# Patient Record
Sex: FEMALE | Race: ASIAN | Employment: UNEMPLOYED | URBAN - METROPOLITAN AREA
[De-identification: names, ages, dates, MRNs, and addresses within clinical notes are randomized per-mention and may not be internally consistent; named-entity substitution may affect disease eponyms.]

---

## 2023-06-21 ENCOUNTER — TELEPHONE (OUTPATIENT)
Facility: CLINIC | Age: 23
End: 2023-06-21

## 2023-06-21 ENCOUNTER — INITIAL PRENATAL (OUTPATIENT)
Facility: CLINIC | Age: 23
End: 2023-06-21
Payer: MEDICAID

## 2023-06-21 ENCOUNTER — HOSPITAL ENCOUNTER (OUTPATIENT)
Dept: ULTRASOUND IMAGING | Facility: HOSPITAL | Age: 23
Discharge: HOME | End: 2023-06-21
Payer: MEDICAID

## 2023-06-21 VITALS
SYSTOLIC BLOOD PRESSURE: 96 MMHG | HEIGHT: 67.32 IN | WEIGHT: 115.19 LBS | BODY MASS INDEX: 17.87 KG/M2 | HEART RATE: 77 BPM | DIASTOLIC BLOOD PRESSURE: 60 MMHG

## 2023-06-21 DIAGNOSIS — O20.9 ANTEPARTUM BLEEDING, FIRST TRIMESTER: ICD-10-CM

## 2023-06-21 DIAGNOSIS — Z34.00 SUPERVISION OF NORMAL FIRST PREGNANCY, ANTEPARTUM: Primary | ICD-10-CM

## 2023-06-21 PROBLEM — R63.6 UNDERWEIGHT: Status: ACTIVE | Noted: 2023-06-21

## 2023-06-21 PROBLEM — O03.4 INCOMPLETE SPONTANEOUS ABORTION: Status: ACTIVE | Noted: 2023-06-21

## 2023-06-21 PROBLEM — Z78.9 LANGUAGE BARRIER: Status: ACTIVE | Noted: 2023-06-21

## 2023-06-21 PROBLEM — Z60.3 LANGUAGE BARRIER: Status: ACTIVE | Noted: 2023-06-21

## 2023-06-21 PROBLEM — Z75.8 LANGUAGE BARRIER: Status: ACTIVE | Noted: 2023-06-21

## 2023-06-21 PROBLEM — O03.4 INCOMPLETE SPONTANEOUS ABORTION (HCC): Status: ACTIVE | Noted: 2023-06-21

## 2023-06-21 LAB
APPEARANCE: CLEAR
BILIRUB UR QL STRIP.AUTO: NEGATIVE
BILIRUBIN: NEGATIVE
CLARITY UR REFRACT.AUTO: CLEAR
COLOR UR AUTO: YELLOW
GLUCOSE (URINE DIPSTICK): NEGATIVE MG/DL
GLUCOSE UR STRIP.AUTO-MCNC: NEGATIVE MG/DL
KETONES (URINE DIPSTICK): NEGATIVE MG/DL
KETONES UR STRIP.AUTO-MCNC: NEGATIVE MG/DL
MULTISTIX LOT#: ABNORMAL NUMERIC
NITRITE UR QL STRIP.AUTO: NEGATIVE
NITRITE, URINE: NEGATIVE
PH UR STRIP.AUTO: 6 [PH] (ref 5–8)
PH, URINE: 5.5 (ref 4.5–8)
PROT UR STRIP.AUTO-MCNC: NEGATIVE MG/DL
PROTEIN (URINE DIPSTICK): NEGATIVE MG/DL
SP GR UR STRIP.AUTO: 1.01 (ref 1–1.03)
SPECIFIC GRAVITY: 1.02 (ref 1–1.03)
URINE-COLOR: YELLOW
UROBILINOGEN UR STRIP.AUTO-MCNC: <2 MG/DL
UROBILINOGEN,SEMI-QN: 0.2 MG/DL (ref 0–1.9)

## 2023-06-21 PROCEDURE — 3078F DIAST BP <80 MM HG: CPT

## 2023-06-21 PROCEDURE — 87086 URINE CULTURE/COLONY COUNT: CPT

## 2023-06-21 PROCEDURE — 81002 URINALYSIS NONAUTO W/O SCOPE: CPT

## 2023-06-21 PROCEDURE — 76817 TRANSVAGINAL US OBSTETRIC: CPT

## 2023-06-21 PROCEDURE — 0500F INITIAL PRENATAL CARE VISIT: CPT

## 2023-06-21 PROCEDURE — 3008F BODY MASS INDEX DOCD: CPT

## 2023-06-21 PROCEDURE — 3074F SYST BP LT 130 MM HG: CPT

## 2023-06-21 PROCEDURE — 76801 OB US < 14 WKS SINGLE FETUS: CPT

## 2023-06-21 PROCEDURE — 81001 URINALYSIS AUTO W/SCOPE: CPT

## 2023-06-21 NOTE — TELEPHONE ENCOUNTER
Called Neli on the STAT scheduling line for central scheduling. In formed her a STAT OB US was needed. Patient 11w2d bleeding. Scheduled for today at [facility] at 4:30 pm. Using [place]  instructions given. Walked to outpatient after patient drank 32 oz of water in the office.

## 2023-06-21 NOTE — TELEPHONE ENCOUNTER
[place]  [id number] utilized for this phone call. Discussed with patient her US results, no fhr seen, fetus measuring 7wk6d when her Gestational age by LMP is 11w2d. Instructed pt a nurse or doctor will call her back with plan of care. Instructed her to go to ED if saturating a pad a hour or have pelvic pain. Verbalizes understanding.

## 2023-06-22 ENCOUNTER — TELEPHONE (OUTPATIENT)
Facility: CLINIC | Age: 23
End: 2023-06-22

## 2023-06-22 ENCOUNTER — LABORATORY ENCOUNTER (OUTPATIENT)
Dept: LAB | Facility: HOSPITAL | Age: 23
End: 2023-06-22
Attending: OBSTETRICS & GYNECOLOGY
Payer: MEDICAID

## 2023-06-22 DIAGNOSIS — Z01.818 PRE-OP TESTING: ICD-10-CM

## 2023-06-22 DIAGNOSIS — O02.1 MISSED ABORTION: Primary | ICD-10-CM

## 2023-06-22 LAB
ANTIBODY SCREEN: NEGATIVE
ERYTHROCYTE [DISTWIDTH] IN BLOOD BY AUTOMATED COUNT: 12.1 %
HCT VFR BLD AUTO: 37.8 %
HGB BLD-MCNC: 12.7 G/DL
MCH RBC QN AUTO: 27.9 PG (ref 26–34)
MCHC RBC AUTO-ENTMCNC: 33.6 G/DL (ref 31–37)
MCV RBC AUTO: 83.1 FL
PLATELET # BLD AUTO: 239 10(3)UL (ref 150–450)
RBC # BLD AUTO: 4.55 X10(6)UL
RH BLOOD TYPE: POSITIVE
WBC # BLD AUTO: 7.3 X10(3) UL (ref 4–11)

## 2023-06-22 PROCEDURE — 86850 RBC ANTIBODY SCREEN: CPT

## 2023-06-22 PROCEDURE — 86901 BLOOD TYPING SEROLOGIC RH(D): CPT

## 2023-06-22 PROCEDURE — 36415 COLL VENOUS BLD VENIPUNCTURE: CPT

## 2023-06-22 PROCEDURE — 85027 COMPLETE CBC AUTOMATED: CPT

## 2023-06-22 PROCEDURE — 86900 BLOOD TYPING SEROLOGIC ABO: CPT

## 2023-06-22 NOTE — TELEPHONE ENCOUNTER
Called patient using a [place]  #[phone]. She is aware she need to have her blood type drawn and depending on the results may need Rhogam and why. Questions addressed and understanding verbalized. Transferred to [place] to discuss scheduled D&C date and time.

## 2023-06-22 NOTE — PROGRESS NOTES
Initial OB 11w2d     [place]  utilized for this visit.      EDC by LMP ( irregular periods)    Pt reports vaginal bleeding for three days - stat US ordered placed today     ObHx:   Two term , 2nd delivery breech presentation - developed a hematoma postpartum   PMhx: tested positive  hepatitis, denies VTE, blood transfusions, or HSV   PSHx: denies       Pt reports vaginal bleeding for three days - speculum exam done, dark red blood oozing from cervix - stat US ordered at hospital today - pt may be miscarrying   -Blood type -order placed today

## 2023-06-22 NOTE — TELEPHONE ENCOUNTER
----- Message from EMILY Sahu sent at 6/21/2023  6:13 PM CDT -----  Regarding: suction D&C  Please schedule her for a suction D&C early Friday 6/23.

## 2023-06-22 NOTE — TELEPHONE ENCOUNTER
----- Message from EMILY Sanon sent at 6/21/2023  7:52 PM CDT -----  Regarding: lab draw  Please call pt to let her know she needs her blood type drawn. I placed the order.

## 2023-06-23 ENCOUNTER — HOSPITAL ENCOUNTER (OUTPATIENT)
Facility: HOSPITAL | Age: 23
Setting detail: HOSPITAL OUTPATIENT SURGERY
Discharge: HOME | End: 2023-06-23
Attending: OBSTETRICS & GYNECOLOGY | Admitting: OBSTETRICS & GYNECOLOGY
Payer: MEDICAID

## 2023-06-23 ENCOUNTER — ANESTHESIA EVENT (OUTPATIENT)
Dept: SURGERY | Facility: HOSPITAL | Age: 23
End: 2023-06-23
Payer: MEDICAID

## 2023-06-23 ENCOUNTER — ANESTHESIA (OUTPATIENT)
Dept: SURGERY | Facility: HOSPITAL | Age: 23
End: 2023-06-23
Payer: MEDICAID

## 2023-06-23 VITALS
BODY MASS INDEX: 18.21 KG/M2 | TEMPERATURE: 97 F | DIASTOLIC BLOOD PRESSURE: 61 MMHG | HEIGHT: 67 IN | SYSTOLIC BLOOD PRESSURE: 98 MMHG | WEIGHT: 116 LBS | HEART RATE: 79 BPM | RESPIRATION RATE: 16 BRPM | OXYGEN SATURATION: 100 %

## 2023-06-23 DIAGNOSIS — Z01.818 PRE-OP TESTING: Primary | ICD-10-CM

## 2023-06-23 DIAGNOSIS — O02.1 MISSED ABORTION: ICD-10-CM

## 2023-06-23 PROCEDURE — 59820 CARE OF MISCARRIAGE: CPT | Performed by: OBSTETRICS & GYNECOLOGY

## 2023-06-23 PROCEDURE — 10D17ZZ EXTRACTION OF PRODUCTS OF CONCEPTION, RETAINED, VIA NATURAL OR ARTIFICIAL OPENING: ICD-10-PCS | Performed by: OBSTETRICS & GYNECOLOGY

## 2023-06-23 RX ORDER — KETOROLAC TROMETHAMINE 30 MG/ML
INJECTION, SOLUTION INTRAMUSCULAR; INTRAVENOUS AS NEEDED
Status: DISCONTINUED | OUTPATIENT
Start: 2023-06-23 | End: 2023-06-23 | Stop reason: SURG

## 2023-06-23 RX ORDER — HYDROMORPHONE HYDROCHLORIDE 1 MG/ML
0.4 INJECTION, SOLUTION INTRAMUSCULAR; INTRAVENOUS; SUBCUTANEOUS EVERY 5 MIN PRN
Status: DISCONTINUED | OUTPATIENT
Start: 2023-06-23 | End: 2023-06-23

## 2023-06-23 RX ORDER — METHYLERGONOVINE MALEATE 0.2 MG/ML
INJECTION INTRAVENOUS AS NEEDED
Status: DISCONTINUED | OUTPATIENT
Start: 2023-06-23 | End: 2023-06-23 | Stop reason: SURG

## 2023-06-23 RX ORDER — ACETAMINOPHEN 500 MG
1000 TABLET ORAL ONCE
Status: DISCONTINUED | OUTPATIENT
Start: 2023-06-23 | End: 2023-06-23 | Stop reason: HOSPADM

## 2023-06-23 RX ORDER — PROCHLORPERAZINE EDISYLATE 5 MG/ML
5 INJECTION INTRAMUSCULAR; INTRAVENOUS EVERY 8 HOURS PRN
Status: DISCONTINUED | OUTPATIENT
Start: 2023-06-23 | End: 2023-06-23

## 2023-06-23 RX ORDER — SCOLOPAMINE TRANSDERMAL SYSTEM 1 MG/1
1 PATCH, EXTENDED RELEASE TRANSDERMAL ONCE
Status: DISCONTINUED | OUTPATIENT
Start: 2023-06-23 | End: 2023-06-23 | Stop reason: HOSPADM

## 2023-06-23 RX ORDER — SODIUM CHLORIDE, SODIUM LACTATE, POTASSIUM CHLORIDE, CALCIUM CHLORIDE 600; 310; 30; 20 MG/100ML; MG/100ML; MG/100ML; MG/100ML
INJECTION, SOLUTION INTRAVENOUS CONTINUOUS
Status: DISCONTINUED | OUTPATIENT
Start: 2023-06-23 | End: 2023-06-23

## 2023-06-23 RX ORDER — LIDOCAINE HYDROCHLORIDE 10 MG/ML
INJECTION, SOLUTION EPIDURAL; INFILTRATION; INTRACAUDAL; PERINEURAL AS NEEDED
Status: DISCONTINUED | OUTPATIENT
Start: 2023-06-23 | End: 2023-06-23 | Stop reason: SURG

## 2023-06-23 RX ORDER — TRAMADOL HYDROCHLORIDE 50 MG/1
50 TABLET ORAL ONCE AS NEEDED
Status: DISCONTINUED | OUTPATIENT
Start: 2023-06-23 | End: 2023-06-23

## 2023-06-23 RX ORDER — HYDROMORPHONE HYDROCHLORIDE 1 MG/ML
0.2 INJECTION, SOLUTION INTRAMUSCULAR; INTRAVENOUS; SUBCUTANEOUS EVERY 5 MIN PRN
Status: DISCONTINUED | OUTPATIENT
Start: 2023-06-23 | End: 2023-06-23

## 2023-06-23 RX ORDER — HEPARIN SODIUM 5000 [USP'U]/ML
5000 INJECTION, SOLUTION INTRAVENOUS; SUBCUTANEOUS ONCE
Status: DISCONTINUED | OUTPATIENT
Start: 2023-06-23 | End: 2023-06-23

## 2023-06-23 RX ORDER — ACETAMINOPHEN 500 MG
1000 TABLET ORAL ONCE AS NEEDED
Status: DISCONTINUED | OUTPATIENT
Start: 2023-06-23 | End: 2023-06-23

## 2023-06-23 RX ORDER — DIPHENHYDRAMINE HYDROCHLORIDE 50 MG/ML
12.5 INJECTION INTRAMUSCULAR; INTRAVENOUS AS NEEDED
Status: DISCONTINUED | OUTPATIENT
Start: 2023-06-23 | End: 2023-06-23

## 2023-06-23 RX ORDER — HYDROMORPHONE HYDROCHLORIDE 1 MG/ML
0.6 INJECTION, SOLUTION INTRAMUSCULAR; INTRAVENOUS; SUBCUTANEOUS EVERY 5 MIN PRN
Status: DISCONTINUED | OUTPATIENT
Start: 2023-06-23 | End: 2023-06-23

## 2023-06-23 RX ORDER — MIDAZOLAM HYDROCHLORIDE 1 MG/ML
1 INJECTION INTRAMUSCULAR; INTRAVENOUS EVERY 5 MIN PRN
Status: DISCONTINUED | OUTPATIENT
Start: 2023-06-23 | End: 2023-06-23

## 2023-06-23 RX ORDER — SCOLOPAMINE TRANSDERMAL SYSTEM 1 MG/1
1 PATCH, EXTENDED RELEASE TRANSDERMAL ONCE
Status: DISCONTINUED | OUTPATIENT
Start: 2023-06-23 | End: 2023-06-23

## 2023-06-23 RX ORDER — OXYCODONE HYDROCHLORIDE 5 MG/1
5 TABLET ORAL ONCE AS NEEDED
Status: DISCONTINUED | OUTPATIENT
Start: 2023-06-23 | End: 2023-06-23

## 2023-06-23 RX ORDER — MEPERIDINE HYDROCHLORIDE 25 MG/ML
12.5 INJECTION INTRAMUSCULAR; INTRAVENOUS; SUBCUTANEOUS AS NEEDED
Status: DISCONTINUED | OUTPATIENT
Start: 2023-06-23 | End: 2023-06-23

## 2023-06-23 RX ORDER — ONDANSETRON 2 MG/ML
4 INJECTION INTRAMUSCULAR; INTRAVENOUS EVERY 6 HOURS PRN
Status: DISCONTINUED | OUTPATIENT
Start: 2023-06-23 | End: 2023-06-23

## 2023-06-23 RX ORDER — NALOXONE HYDROCHLORIDE 0.4 MG/ML
80 INJECTION, SOLUTION INTRAMUSCULAR; INTRAVENOUS; SUBCUTANEOUS AS NEEDED
Status: DISCONTINUED | OUTPATIENT
Start: 2023-06-23 | End: 2023-06-23

## 2023-06-23 RX ORDER — SODIUM CHLORIDE, SODIUM LACTATE, POTASSIUM CHLORIDE, CALCIUM CHLORIDE 600; 310; 30; 20 MG/100ML; MG/100ML; MG/100ML; MG/100ML
INJECTION, SOLUTION INTRAVENOUS CONTINUOUS PRN
Status: DISCONTINUED | OUTPATIENT
Start: 2023-06-23 | End: 2023-06-23 | Stop reason: SURG

## 2023-06-23 RX ADMIN — LIDOCAINE HYDROCHLORIDE 50 MG: 10 INJECTION, SOLUTION EPIDURAL; INFILTRATION; INTRACAUDAL; PERINEURAL at 16:15:00

## 2023-06-23 RX ADMIN — METHYLERGONOVINE MALEATE 0.2 MG: 0.2 INJECTION INTRAVENOUS at 16:35:00

## 2023-06-23 RX ADMIN — SODIUM CHLORIDE, SODIUM LACTATE, POTASSIUM CHLORIDE, CALCIUM CHLORIDE: 600; 310; 30; 20 INJECTION, SOLUTION INTRAVENOUS at 16:12:00

## 2023-06-23 RX ADMIN — KETOROLAC TROMETHAMINE 30 MG: 30 INJECTION, SOLUTION INTRAMUSCULAR; INTRAVENOUS at 16:33:00

## 2023-06-23 NOTE — DISCHARGE INSTRUCTIONS
Home Care Instructions  DILATATION AND CURETTAGE (D&C)      1. Take it easy for the first 24 hours. Stay at home if possible and make sure someone is at home to help you or to report any symptoms or problems that you might have. 2. You may feel weak, dizzy, or a little lightheaded from the anesthesia during the first 24 hours. If so, stay in bed and rest and do not climb up and down the stairs. If you cannot completely avoid stairs, make sure that you have assistance. 3. Do not drive for 24 hours after surgery. 4. You may have spotting or discharge for the next few days. However, any heavy bleeding is abnormal and should be reported immediately. 5. Take you temperature once a day for the next 7 days and report to us if your temperature is up to 101 degrees or more in the absence of any common cold symptoms. 6. You may have some cramping, lower abdominal pains or shoulder pain for the first 24 hours. Usually two aspirin, Advil Ibuprofen or Tylenol every 4-6 hours takes care of this problem. If the pain persists or gets worse, notify the doctor immediately. 7. Avoid douching or intercourse for ten days. You may take a shower but do not take a bath for 10 days. 8. Please call in advance and make your appointment for a post-operative checkup at the office in 3-4 week.     9. Please do not hesitate to call if you have any problems or questions        [phone]    You received a drug called Toradol which is an Anti Inflammatory at: 4:30pm  If you are allowed to take Anti inflammatories:    Do not take any Anti Inflammatory like Motrin, Aleve or Ibuprofen until after: 10:30pm  Please report any suspected allergic reactions or bleeding issues to your doctor

## 2023-06-23 NOTE — OPERATIVE REPORT
Pre-op Dx: Missed AB, IUP 11w 4d  Post-op Dx:same  Surgeon: Nataliia Puentes  Procedure: suction  D&C  Complications: none  Findings: 11 week uterus  Procedure note: Pt was taken to the O.R. Where anesthesia was obtained without difficulty. She was then prepped and draped in normal sterile fashion in dorsal lithotomy position. Weighted speculum was placed in patients vagina and single tooth tenaculum applied to anterior lip of the cervix. Cervix was then gently dilated, uterus sounded to be 11 cm,  8 mm suction curette was introduced and uterus cleared of product of conceptin [facility] curettage was performed, all instruments removed from patients vagina, good hemostasis noted.

## 2023-07-12 LAB
CELLS ANALYZED CHRPOC: 13
CELLS COUNTED CHRPOC: 13
CELLS KARYOTYPED CHRPOC: 2
GTG BAND RES ACHIEVED CHRPOC: 400

## 2023-07-25 ENCOUNTER — LAB ENCOUNTER (OUTPATIENT)
Dept: LAB | Facility: HOSPITAL | Age: 23
End: 2023-07-25
Attending: OBSTETRICS & GYNECOLOGY
Payer: MEDICAID

## 2023-07-25 ENCOUNTER — OFFICE VISIT (OUTPATIENT)
Facility: CLINIC | Age: 23
End: 2023-07-25
Payer: MEDICAID

## 2023-07-25 VITALS
BODY MASS INDEX: 18 KG/M2 | WEIGHT: 118 LBS | HEART RATE: 83 BPM | DIASTOLIC BLOOD PRESSURE: 62 MMHG | SYSTOLIC BLOOD PRESSURE: 90 MMHG

## 2023-07-25 DIAGNOSIS — N92.6 IRREGULAR BLEEDING: Primary | ICD-10-CM

## 2023-07-25 DIAGNOSIS — N92.6 IRREGULAR BLEEDING: ICD-10-CM

## 2023-07-25 LAB — B-HCG SERPL-ACNC: 4 MIU/ML

## 2023-07-25 PROCEDURE — 36415 COLL VENOUS BLD VENIPUNCTURE: CPT

## 2023-07-25 PROCEDURE — 84702 CHORIONIC GONADOTROPIN TEST: CPT

## 2023-07-25 NOTE — PROGRESS NOTES
Ady Rodríguez is a 20year old female  Patient's last menstrual period was 2023. Patient presents with:  Post-Op: 2 wk, having some bleeding after the surgery  Sx 23 D&C  . Patient had D&C for SAB 23, started bleeding  and still continues, patient eventually would like to have an IUD for contraception    OBSTETRICS HISTORY:  OB History    Para Term  AB Living   4 2 2   1 2   SAB IAB Ectopic Multiple Live Births           2      # Outcome Date GA Lbr Justus/2nd Weight Sex Delivery Anes PTL Lv   4             3 Term 21 40w0d  6 lb 2.1 oz (2.78 kg) F NORMAL SPONT   LUIS ARMANDO   2 Term 06/10/19 40w0d  6 lb 9.8 oz (3 kg) M NORMAL SPONT   LUIS ARMANDO   1 AB               Obstetric Comments   Current - 1st OB visit 2023 - US on 23 at 12w2d by LMP. IUFD measuring 7w6d. GYNE HISTORY:  Periods regular monthly      Sexual activity:   Not on file                 MEDICAL HISTORY:  Past Medical History:   Diagnosis Date    Language barrier     [place]     PONV (postoperative nausea and vomiting)     Underweight 2023       SURGICAL HISTORY:  No past surgical history on file.     SOCIAL HISTORY:  Social History    Socioeconomic History      Marital status:       Spouse name: Not on file      Number of children: Not on file      Years of education: Not on file      Highest education level: Not on file    Occupational History      Not on file    Tobacco Use      Smoking status: Never      Smokeless tobacco: Never    Vaping Use      Vaping Use: Never used    Substance and Sexual Activity      Alcohol use: Not Currently      Drug use: Never      Sexual activity: Not on file    Other Topics      Concerns:        Not on file    Social History Narrative      Not on file    Social Determinants of Health  Financial Resource Strain: Not on file  Food Insecurity: Not on file  Transportation Needs: Not on file  Stress: Not on file  Housing Stability: Not on file    FAMILY HISTORY:  No family history on file. MEDICATIONS:  No current outpatient medications on file. ALLERGIES:  No Known Allergies      PHYSICAL EXAM:   Pelvic Exam:  External Genitalia: normal appearance, hair distribution, and no lesions  Urethral Meatus:  normal in size, location, without lesions and prolapse  Bladder:  No fullness, masses or tenderness  Vagina:  Normal appearance without lesions, no abnormal discharge  Cervix:  Normal without tenderness on motion  Uterus: normal in size, contour, position, mobility, without tenderness  Adnexa: normal without masses or tenderness  Perineum: normal  Anus: no hemorroids     Assessment & Plan:  1. Irregular bleeding  - check for retained POC - if tests negative return for IUD  - HCG, BETA SUBUNIT (QUANT PREGNANCY TEST); Future  - US TRANSVAG/ABDOMINAL EMG ONLY;  Future

## 2023-07-26 NOTE — PROGRESS NOTES
[place]  #[id number]    Pt aware of results. Will await ultrasound results. Verbalized understanding.

## 2023-07-27 ENCOUNTER — ULTRASOUND ENCOUNTER (OUTPATIENT)
Facility: CLINIC | Age: 23
End: 2023-07-27
Payer: MEDICAID

## 2023-07-27 ENCOUNTER — TELEPHONE (OUTPATIENT)
Facility: CLINIC | Age: 23
End: 2023-07-27

## 2023-07-27 RX ORDER — METHYLERGONOVINE MALEATE 0.2 MG/1
0.2 TABLET ORAL EVERY 8 HOURS
Qty: 9 TABLET | Refills: 0 | Status: SHIPPED | OUTPATIENT
Start: 2023-07-27 | End: 2023-07-30

## 2023-07-27 RX ORDER — DOXYCYCLINE HYCLATE 100 MG
100 TABLET ORAL 2 TIMES DAILY
Qty: 28 TABLET | Refills: 0 | Status: SHIPPED | OUTPATIENT
Start: 2023-07-27 | End: 2023-08-10

## 2023-07-27 NOTE — TELEPHONE ENCOUNTER
Pt requesting for medication to help with her bleeding. Discussed Per Dr. Sofya Ramirez no RPOC noted in [address], bleeding may decrease over time. Pt can take ibuprofen/Tylenol for pain management. Per pt's spouse they do not have work or money at this time. Language barrier is difficult for them. If any sign and symptoms below to go to ER:  Bleeding that soaks through 1 pad an hour and is not slowing down  Blood clots bigger than a golf ball  Feeling faint, dizzy, chills, sweaty, nauseous with bleeding. Reluctant to go to ER they have little kids at home. They plan to monitor bleeding for now. Will route for further recommendations. Patient verbalized understanding, agreed to and intend to comply with plan of care.

## 2023-07-27 NOTE — TELEPHONE ENCOUNTER
Pt complained of heavy bleeding since 07/16/2023 D&C. Pt had ultrasound today and was concerned she's bleeding too much. Recommended to wait for the call after MD reviews ultrasound results, if bleeding is really heavy to go to ER. Please call with [place] .

## 2023-07-27 NOTE — TELEPHONE ENCOUNTER
called pt twice did not go through    Last seen 7/25/23     Called pt's spouse was able to speak with pt. C/o heavy bleeding since 07/16/2023 D&C. Had ultrasound today     Soaked pad changes q 1-2h, some dizziness, abd pain, passing quarter size blood clots. Denies foul smell, fever, or SOB. Per pt her s/s same as when she was last seen in office on 7/25/23. Can increase fluids, take MV. Will call pt with US result recommendation. Recommended to go to ER if s/s persist.     Will f/u with pt. Patient verbalized understanding, agreed to and intend to comply with plan of care.

## 2023-07-28 PROCEDURE — 99284 EMERGENCY DEPT VISIT MOD MDM: CPT

## 2023-07-28 PROCEDURE — 96360 HYDRATION IV INFUSION INIT: CPT

## 2023-07-28 NOTE — TELEPHONE ENCOUNTER
Discussed Dr. Melissa Lindo recommendation:   Doxycycline Antibiotics to treat possible infection:  Take 1 tablet (100 mg total) by mouth 2 (two) times daily for 14 days     Methergine to help with bleeding: Take 1 tablet (0.2 mg total) by mouth every 8 (eight) hours for 3 days. Pt's s/s:bleeding same as yesterday, less dizziness today, pain is less in daytime and stronger at night. If sign and symptoms below go to ER:  Bleeding that soaks through 1 pad an hour and is not slowing down  Blood clots bigger than a golf ball  Feeling faint, dizzy, chills, sweaty, nauseous with bleeding. Patient & spouse verbalized understanding, was able to verbalized name of medications and how to take each medication prescribed & where to pick it up. Agreed to and intend to comply with plan of care.

## 2023-07-29 ENCOUNTER — HOSPITAL ENCOUNTER (EMERGENCY)
Facility: HOSPITAL | Age: 23
Discharge: HOME | End: 2023-07-29
Attending: EMERGENCY MEDICINE
Payer: MEDICAID

## 2023-07-29 VITALS
OXYGEN SATURATION: 100 % | BODY MASS INDEX: 18.19 KG/M2 | HEIGHT: 68 IN | WEIGHT: 120 LBS | RESPIRATION RATE: 16 BRPM | SYSTOLIC BLOOD PRESSURE: 107 MMHG | DIASTOLIC BLOOD PRESSURE: 79 MMHG | HEART RATE: 73 BPM | TEMPERATURE: 97 F

## 2023-07-29 DIAGNOSIS — N93.8 DUB (DYSFUNCTIONAL UTERINE BLEEDING): Primary | ICD-10-CM

## 2023-07-29 LAB
ALBUMIN SERPL-MCNC: 4.4 G/DL (ref 3.4–5)
ALBUMIN/GLOB SERPL: 1.5 {RATIO} (ref 1–2)
ALP LIVER SERPL-CCNC: 50 U/L
ALT SERPL-CCNC: 16 U/L
ANION GAP SERPL CALC-SCNC: 2 MMOL/L (ref 0–18)
AST SERPL-CCNC: 11 U/L (ref 15–37)
B-HCG SERPL-ACNC: 4 MIU/ML
BASOPHILS # BLD AUTO: 0.06 X10(3) UL (ref 0–0.2)
BASOPHILS NFR BLD AUTO: 0.6 %
BILIRUB SERPL-MCNC: 0.6 MG/DL (ref 0.1–2)
BUN BLD-MCNC: 9 MG/DL (ref 7–18)
CALCIUM BLD-MCNC: 9 MG/DL (ref 8.5–10.1)
CHLORIDE SERPL-SCNC: 109 MMOL/L (ref 98–112)
CO2 SERPL-SCNC: 31 MMOL/L (ref 21–32)
CREAT BLD-MCNC: 0.7 MG/DL
EGFRCR SERPLBLD CKD-EPI 2021: 123 ML/MIN/1.73M2 (ref 60–?)
EOSINOPHIL # BLD AUTO: 0.2 X10(3) UL (ref 0–0.7)
EOSINOPHIL NFR BLD AUTO: 2 %
ERYTHROCYTE [DISTWIDTH] IN BLOOD BY AUTOMATED COUNT: 12.3 %
GLOBULIN PLAS-MCNC: 3 G/DL (ref 2.8–4.4)
GLUCOSE BLD-MCNC: 93 MG/DL (ref 70–99)
HCT VFR BLD AUTO: 38.4 %
HGB BLD-MCNC: 12.8 G/DL
IMM GRANULOCYTES # BLD AUTO: 0.03 X10(3) UL (ref 0–1)
IMM GRANULOCYTES NFR BLD: 0.3 %
LYMPHOCYTES # BLD AUTO: 3.59 X10(3) UL (ref 1–4)
LYMPHOCYTES NFR BLD AUTO: 35.3 %
MCH RBC QN AUTO: 28.4 PG (ref 26–34)
MCHC RBC AUTO-ENTMCNC: 33.3 G/DL (ref 31–37)
MCV RBC AUTO: 85.1 FL
MONOCYTES # BLD AUTO: 0.56 X10(3) UL (ref 0.1–1)
MONOCYTES NFR BLD AUTO: 5.5 %
NEUTROPHILS # BLD AUTO: 5.72 X10 (3) UL (ref 1.5–7.7)
NEUTROPHILS # BLD AUTO: 5.72 X10(3) UL (ref 1.5–7.7)
NEUTROPHILS NFR BLD AUTO: 56.3 %
OSMOLALITY SERPL CALC.SUM OF ELEC: 292 MOSM/KG (ref 275–295)
PLATELET # BLD AUTO: 294 10(3)UL (ref 150–450)
POTASSIUM SERPL-SCNC: 3.9 MMOL/L (ref 3.5–5.1)
PROT SERPL-MCNC: 7.4 G/DL (ref 6.4–8.2)
RBC # BLD AUTO: 4.51 X10(6)UL
RH BLOOD TYPE: POSITIVE
SODIUM SERPL-SCNC: 142 MMOL/L (ref 136–145)
WBC # BLD AUTO: 10.2 X10(3) UL (ref 4–11)

## 2023-07-29 PROCEDURE — 85025 COMPLETE CBC W/AUTO DIFF WBC: CPT | Performed by: EMERGENCY MEDICINE

## 2023-07-29 PROCEDURE — 86901 BLOOD TYPING SEROLOGIC RH(D): CPT | Performed by: EMERGENCY MEDICINE

## 2023-07-29 PROCEDURE — 80053 COMPREHEN METABOLIC PANEL: CPT | Performed by: EMERGENCY MEDICINE

## 2023-07-29 PROCEDURE — 84702 CHORIONIC GONADOTROPIN TEST: CPT | Performed by: EMERGENCY MEDICINE

## 2023-07-29 PROCEDURE — 86900 BLOOD TYPING SEROLOGIC ABO: CPT | Performed by: EMERGENCY MEDICINE

## 2023-07-29 RX ORDER — ACETAMINOPHEN 500 MG
1000 TABLET ORAL ONCE
Status: COMPLETED | OUTPATIENT
Start: 2023-07-29 | End: 2023-07-29

## 2023-08-18 ENCOUNTER — OFFICE VISIT (OUTPATIENT)
Dept: OBGYN CLINIC | Facility: CLINIC | Age: 23
End: 2023-08-18
Payer: MEDICAID

## 2023-08-18 VITALS
BODY MASS INDEX: 17.37 KG/M2 | HEART RATE: 77 BPM | DIASTOLIC BLOOD PRESSURE: 77 MMHG | SYSTOLIC BLOOD PRESSURE: 114 MMHG | HEIGHT: 68 IN | WEIGHT: 114.63 LBS

## 2023-08-18 DIAGNOSIS — N60.02 CYST OF LEFT NIPPLE: Primary | ICD-10-CM

## 2023-08-18 DIAGNOSIS — N64.4 BREAST PAIN, LEFT: ICD-10-CM

## 2023-08-18 PROCEDURE — 3078F DIAST BP <80 MM HG: CPT

## 2023-08-18 PROCEDURE — 3074F SYST BP LT 130 MM HG: CPT

## 2023-08-18 PROCEDURE — 99212 OFFICE O/P EST SF 10 MIN: CPT

## 2023-08-18 PROCEDURE — 3008F BODY MASS INDEX DOCD: CPT

## 2023-08-25 ENCOUNTER — OFFICE VISIT (OUTPATIENT)
Facility: LOCATION | Age: 23
End: 2023-08-25
Payer: MEDICAID

## 2023-08-25 VITALS — TEMPERATURE: 98 F | HEART RATE: 79 BPM

## 2023-08-25 DIAGNOSIS — N64.9 ABNORMAL NIPPLE: Primary | ICD-10-CM

## 2023-08-25 PROCEDURE — 99203 OFFICE O/P NEW LOW 30 MIN: CPT | Performed by: SURGERY

## 2023-08-29 ENCOUNTER — HOSPITAL ENCOUNTER (OUTPATIENT)
Dept: MAMMOGRAPHY | Facility: HOSPITAL | Age: 23
Discharge: HOME | End: 2023-08-29
Attending: SURGERY
Payer: MEDICAID

## 2023-08-29 DIAGNOSIS — N64.9 ABNORMAL NIPPLE: ICD-10-CM

## 2023-08-29 PROCEDURE — 76642 ULTRASOUND BREAST LIMITED: CPT | Performed by: SURGERY

## 2023-11-06 ENCOUNTER — TELEPHONE (OUTPATIENT)
Facility: CLINIC | Age: 23
End: 2023-11-06

## 2023-11-06 DIAGNOSIS — O09.299 HISTORY OF MISCARRIAGE, CURRENTLY PREGNANT: Primary | ICD-10-CM

## 2023-11-06 NOTE — TELEPHONE ENCOUNTER
LMP- 9/21/23 6 weeks 4 days G-5 P-2 Recent SAB 6/23/23 Faint positive pregnancy test 11/5/23. Orders pended for early blood work. Has an appointment tomorrow with Selam Fink. Will call pt once orders have been placed.

## 2023-11-06 NOTE — TELEPHONE ENCOUNTER
Pt came in to office and spoke with NL in [facility]. Pt would like HCG test ordered because she is early pregnancy with history of MC. Please advise. LMP 9/21 . Took pregnancy appt 11/5 and was faintly positive.

## 2023-11-07 ENCOUNTER — LAB ENCOUNTER (OUTPATIENT)
Dept: LAB | Facility: HOSPITAL | Age: 23
End: 2023-11-07
Payer: MEDICAID

## 2023-11-07 DIAGNOSIS — O09.299 HISTORY OF MISCARRIAGE, CURRENTLY PREGNANT: ICD-10-CM

## 2023-11-07 LAB
B-HCG SERPL-ACNC: ABNORMAL MIU/ML
PROGEST SERPL-MCNC: 14.6 NG/ML

## 2023-11-07 PROCEDURE — 84702 CHORIONIC GONADOTROPIN TEST: CPT

## 2023-11-07 PROCEDURE — 36415 COLL VENOUS BLD VENIPUNCTURE: CPT

## 2023-11-07 PROCEDURE — 84144 ASSAY OF PROGESTERONE: CPT

## 2023-11-17 ENCOUNTER — OFFICE VISIT (OUTPATIENT)
Facility: CLINIC | Age: 23
End: 2023-11-17
Payer: MEDICAID

## 2023-11-17 VITALS
WEIGHT: 117.19 LBS | SYSTOLIC BLOOD PRESSURE: 110 MMHG | DIASTOLIC BLOOD PRESSURE: 70 MMHG | HEIGHT: 68 IN | BODY MASS INDEX: 17.76 KG/M2 | HEART RATE: 84 BPM

## 2023-11-17 DIAGNOSIS — O20.0 THREATENED ABORTION, ANTEPARTUM: Primary | ICD-10-CM

## 2023-11-17 PROCEDURE — 99213 OFFICE O/P EST LOW 20 MIN: CPT | Performed by: OBSTETRICS & GYNECOLOGY

## 2023-11-17 PROCEDURE — 3078F DIAST BP <80 MM HG: CPT | Performed by: OBSTETRICS & GYNECOLOGY

## 2023-11-17 PROCEDURE — 3008F BODY MASS INDEX DOCD: CPT | Performed by: OBSTETRICS & GYNECOLOGY

## 2023-11-17 PROCEDURE — 3074F SYST BP LT 130 MM HG: CPT | Performed by: OBSTETRICS & GYNECOLOGY

## 2023-11-17 RX ORDER — PROGESTERONE 200 MG/1
200 CAPSULE ORAL NIGHTLY
Qty: 30 CAPSULE | Refills: 0 | Status: SHIPPED | OUTPATIENT
Start: 2023-11-17

## 2023-11-17 NOTE — PROGRESS NOTES
Camila Gordon is a 20year old female [id number] Patient's last menstrual period was 2023 (exact date). Chief Complaint   Patient presents with    Gyn Problem      was in ER for early pregnancy bleeding  No more bleeding       Other   . Patient is 8w1d by LMP, had gush of blood couple days ago, went to ED - viable pregnancy, subchorionic hemorrhage note, no bleeding since    OBSTETRICS HISTORY:  OB History    Para Term  AB Living   4 2 2   2 2   SAB IAB Ectopic Multiple Live Births   2       2      # Outcome Date GA Lbr Justus/2nd Weight Sex Delivery Anes PTL Lv   4 SAB 2023           3 Term 21 40w0d  6 lb 2.1 oz (2.78 kg) F NORMAL SPONT   LUIS ARMANDO   2 2020      1 Term 06/10/19 40w0d  6 lb 9.8 oz (3 kg) M NORMAL SPONT   LUIS ARMANDO      Obstetric Comments   Current - 1st OB visit 2023 - US on 23 at 12w2d by LMP. IUFD measuring 7w6d. GYNE HISTORY:  Periods regular monthly    History   Sexual Activity    Sexual activity: Not Currently                 MEDICAL HISTORY:  Past Medical History:   Diagnosis Date    Language barrier     [place]     PONV (postoperative nausea and vomiting)     Underweight 2023       SURGICAL HISTORY:  History reviewed. No pertinent surgical history.     SOCIAL HISTORY:  Social History     Socioeconomic History    Marital status:      Spouse name: Not on file    Number of children: Not on file    Years of education: Not on file    Highest education level: Not on file   Occupational History    Not on file   Tobacco Use    Smoking status: Never    Smokeless tobacco: Never   Vaping Use    Vaping Use: Never used   Substance and Sexual Activity    Alcohol use: Not Currently    Drug use: Never    Sexual activity: Not Currently   Other Topics Concern    Not on file   Social History Narrative    Not on file     Social Determinants of Health     Financial Resource Strain: Not on file   Food Insecurity: Not on file   Transportation Needs: Not on file   Physical Activity: Not on file   Stress: Not on file   Social Connections: Not on file   Housing Stability: Not on file       FAMILY HISTORY:  History reviewed. No pertinent family history. MEDICATIONS:    Current Outpatient Medications:     progesterone 200 MG Oral Cap, Place 1 capsule (200 mg total) vaginally nightly., Disp: 30 capsule, Rfl: 0    ALLERGIES:  No Known Allergies      PHYSICAL EXAM:   Pelvic Exam:  External Genitalia: normal appearance, hair distribution, and no lesions  Urethral Meatus:  normal in size, location, without lesions and prolapse  Bladder:  No fullness, masses or tenderness  Vagina:  Normal appearance without lesions, no abnormal discharge  Cervix:  Normal without tenderness on motion  Uterus: normal in size, contour, position, mobility, without tenderness  Adnexa: normal without masses or tenderness  Perineum: normal  Anus: no hemorroids     Progesterone - 14.6  11/10/23    Findings: There is an intrauterine gestational sac with yolk sac and single embryo. Embryonic heart rate 144 bpm. Crown-rump length gives age of 6 weeks 5 days. Gestational sac size gives age of 9 weeks 1 day. Composite age is 17 weeks 0 days giving an VERONICA of 2024. There is a prominent subchorionic hemorrhage measuring 5.3 x 2.4 x 4.7 cm. Follow-up as clinically warranted. Right ovary measures 6.2 x 4.4 x 4.3 cm. Prominent corpus luteum cyst appears simple and measures 4.2 cm. Venous and arterial Doppler signal seen in the right ovary. Left ovary measures 2.2 x 2.8 x 2 cm. Venous and arterial Doppler signal demonstrated in the left ovary. Some free fluid noted. Impression:     Single live IUP. Prominent subchorionic hemorrhage. Follow-up as clinically warranted. Prominent simple appearing corpus luteum cyst right ovary measuring 4.2 cm. Assessment & Plan:  1.  Threatened , antepartum  - pelvic rest  - prometrium 200 nightly  - return in 2 weeks for US and 1st OB

## 2023-11-29 ENCOUNTER — OFFICE VISIT (OUTPATIENT)
Facility: LOCATION | Age: 23
End: 2023-11-29
Payer: MEDICAID

## 2023-11-29 ENCOUNTER — TELEPHONE (OUTPATIENT)
Facility: CLINIC | Age: 23
End: 2023-11-29

## 2023-11-29 ENCOUNTER — INITIAL PRENATAL (OUTPATIENT)
Facility: CLINIC | Age: 23
End: 2023-11-29
Payer: MEDICAID

## 2023-11-29 ENCOUNTER — ULTRASOUND ENCOUNTER (OUTPATIENT)
Facility: CLINIC | Age: 23
End: 2023-11-29
Payer: MEDICAID

## 2023-11-29 VITALS
BODY MASS INDEX: 18.13 KG/M2 | WEIGHT: 119.63 LBS | HEIGHT: 68 IN | HEART RATE: 81 BPM | DIASTOLIC BLOOD PRESSURE: 60 MMHG | SYSTOLIC BLOOD PRESSURE: 98 MMHG

## 2023-11-29 VITALS — HEART RATE: 84 BPM | TEMPERATURE: 98 F

## 2023-11-29 DIAGNOSIS — N64.4 BILATERAL MASTODYNIA: ICD-10-CM

## 2023-11-29 DIAGNOSIS — N64.9 ABNORMAL NIPPLE: Primary | ICD-10-CM

## 2023-11-29 DIAGNOSIS — Z34.91 INITIAL OBSTETRIC VISIT IN FIRST TRIMESTER: Primary | ICD-10-CM

## 2023-11-29 LAB
BILIRUBIN: NEGATIVE
GLUCOSE (URINE DIPSTICK): NEGATIVE MG/DL
KETONES (URINE DIPSTICK): NEGATIVE MG/DL
LEUKOCYTES: NEGATIVE
MULTISTIX LOT#: NORMAL NUMERIC
NITRITE, URINE: NEGATIVE
OCCULT BLOOD: NEGATIVE
PH, URINE: 7 (ref 4.5–8)
PROTEIN (URINE DIPSTICK): NEGATIVE MG/DL
SPECIFIC GRAVITY: 1.01 (ref 1–1.03)
UROBILINOGEN,SEMI-QN: 0.2 MG/DL (ref 0–1.9)

## 2023-11-29 PROCEDURE — 87491 CHLMYD TRACH DNA AMP PROBE: CPT

## 2023-11-29 PROCEDURE — 76801 OB US < 14 WKS SINGLE FETUS: CPT | Performed by: OBSTETRICS & GYNECOLOGY

## 2023-11-29 PROCEDURE — 87591 N.GONORRHOEAE DNA AMP PROB: CPT

## 2023-11-29 PROCEDURE — 87086 URINE CULTURE/COLONY COUNT: CPT

## 2023-11-29 PROCEDURE — 3008F BODY MASS INDEX DOCD: CPT

## 2023-11-29 PROCEDURE — 3074F SYST BP LT 130 MM HG: CPT

## 2023-11-29 PROCEDURE — 81002 URINALYSIS NONAUTO W/O SCOPE: CPT

## 2023-11-29 PROCEDURE — 3078F DIAST BP <80 MM HG: CPT

## 2023-11-29 PROCEDURE — 99213 OFFICE O/P EST LOW 20 MIN: CPT

## 2023-11-29 PROCEDURE — 99213 OFFICE O/P EST LOW 20 MIN: CPT | Performed by: SURGERY

## 2023-11-29 RX ORDER — PRENATAL VIT/IRON FUM/FOLIC AC 27MG-0.8MG
TABLET ORAL
COMMUNITY
Start: 2023-11-10 | End: 2023-11-29

## 2023-11-29 RX ORDER — PRENATAL VIT,CAL 73/IRON/FOLIC 28 MG-1 MG
1 TABLET ORAL DAILY
COMMUNITY

## 2023-11-29 NOTE — PROGRESS NOTES
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 20year old [id number], EDC by LMP   -NIPT & Carrier discussed - wants at next visit     1. Vaginal bleeding   -Went to [facility] ED , ultrasound done -subchorionic hematoma, live intrauterine pregnancy, right ovarian cyst with minimal complexity.   -Had 1st trimester US done in clinic today      - single viable iup at 9w6d  fht= 185bpm  ys present  rt ovary corpus luteal cyst measuring 4.8x3.3cm  lt ovary is wnl  there is seen large subchorionic hemorrhage,measuring 4.3x2.3cm near fundus      2. Will need Pap postpartum

## 2023-11-30 LAB
C TRACH DNA SPEC QL NAA+PROBE: NEGATIVE
N GONORRHOEA DNA SPEC QL NAA+PROBE: NEGATIVE

## 2023-12-11 DIAGNOSIS — O36.80X0 PREGNANCY WITH INCONCLUSIVE FETAL VIABILITY, SINGLE OR UNSPECIFIED FETUS: Primary | ICD-10-CM

## 2023-12-27 ENCOUNTER — ROUTINE PRENATAL (OUTPATIENT)
Facility: CLINIC | Age: 23
End: 2023-12-27
Payer: MEDICAID

## 2023-12-27 ENCOUNTER — LAB ENCOUNTER (OUTPATIENT)
Dept: LAB | Facility: HOSPITAL | Age: 23
End: 2023-12-27
Payer: MEDICAID

## 2023-12-27 ENCOUNTER — TELEPHONE (OUTPATIENT)
Facility: CLINIC | Age: 23
End: 2023-12-27

## 2023-12-27 VITALS
HEART RATE: 89 BPM | WEIGHT: 117 LBS | SYSTOLIC BLOOD PRESSURE: 98 MMHG | BODY MASS INDEX: 17.73 KG/M2 | DIASTOLIC BLOOD PRESSURE: 66 MMHG | HEIGHT: 68 IN

## 2023-12-27 DIAGNOSIS — Z34.91 INITIAL OBSTETRIC VISIT IN FIRST TRIMESTER: ICD-10-CM

## 2023-12-27 DIAGNOSIS — Z34.91 PRENATAL CARE IN FIRST TRIMESTER: Primary | ICD-10-CM

## 2023-12-27 DIAGNOSIS — O09.299 HISTORY OF MISCARRIAGE, CURRENTLY PREGNANT: ICD-10-CM

## 2023-12-27 DIAGNOSIS — O20.9 ANTEPARTUM BLEEDING, FIRST TRIMESTER: ICD-10-CM

## 2023-12-27 LAB
ANTIBODY SCREEN: NEGATIVE
B-HCG SERPL-ACNC: ABNORMAL MIU/ML
BASOPHILS # BLD AUTO: 0.02 X10(3) UL (ref 0–0.2)
BASOPHILS NFR BLD AUTO: 0.2 %
EOSINOPHIL # BLD AUTO: 0.06 X10(3) UL (ref 0–0.7)
EOSINOPHIL NFR BLD AUTO: 0.7 %
ERYTHROCYTE [DISTWIDTH] IN BLOOD BY AUTOMATED COUNT: 13.8 %
HBV SURFACE AG SER-ACNC: <0.1 [IU]/L
HBV SURFACE AG SERPL QL IA: NONREACTIVE
HCT VFR BLD AUTO: 35.5 %
HCV AB SERPL QL IA: NONREACTIVE
HGB BLD-MCNC: 11.7 G/DL
IMM GRANULOCYTES # BLD AUTO: 0.04 X10(3) UL (ref 0–1)
IMM GRANULOCYTES NFR BLD: 0.5 %
LYMPHOCYTES # BLD AUTO: 1.83 X10(3) UL (ref 1–4)
LYMPHOCYTES NFR BLD AUTO: 20.6 %
MCH RBC QN AUTO: 28.3 PG (ref 26–34)
MCHC RBC AUTO-ENTMCNC: 33 G/DL (ref 31–37)
MCV RBC AUTO: 85.7 FL
MONOCYTES # BLD AUTO: 0.4 X10(3) UL (ref 0.1–1)
MONOCYTES NFR BLD AUTO: 4.5 %
NEUTROPHILS # BLD AUTO: 6.52 X10 (3) UL (ref 1.5–7.7)
NEUTROPHILS # BLD AUTO: 6.52 X10(3) UL (ref 1.5–7.7)
NEUTROPHILS NFR BLD AUTO: 73.5 %
PLATELET # BLD AUTO: 324 10(3)UL (ref 150–450)
RBC # BLD AUTO: 4.14 X10(6)UL
RH BLOOD TYPE: POSITIVE
RUBV IGG SER QL: POSITIVE
RUBV IGG SER-ACNC: >500 IU/ML (ref 10–?)
T PALLIDUM AB SER QL IA: NONREACTIVE
WBC # BLD AUTO: 8.9 X10(3) UL (ref 4–11)

## 2023-12-27 PROCEDURE — 3074F SYST BP LT 130 MM HG: CPT

## 2023-12-27 PROCEDURE — 36415 COLL VENOUS BLD VENIPUNCTURE: CPT

## 2023-12-27 PROCEDURE — 99213 OFFICE O/P EST LOW 20 MIN: CPT

## 2023-12-27 PROCEDURE — 3008F BODY MASS INDEX DOCD: CPT

## 2023-12-27 PROCEDURE — 86901 BLOOD TYPING SEROLOGIC RH(D): CPT

## 2023-12-27 PROCEDURE — 85025 COMPLETE CBC W/AUTO DIFF WBC: CPT

## 2023-12-27 PROCEDURE — 86803 HEPATITIS C AB TEST: CPT

## 2023-12-27 PROCEDURE — 86780 TREPONEMA PALLIDUM: CPT

## 2023-12-27 PROCEDURE — 87389 HIV-1 AG W/HIV-1&-2 AB AG IA: CPT

## 2023-12-27 PROCEDURE — 86850 RBC ANTIBODY SCREEN: CPT

## 2023-12-27 PROCEDURE — 3078F DIAST BP <80 MM HG: CPT

## 2023-12-27 PROCEDURE — 86762 RUBELLA ANTIBODY: CPT

## 2023-12-27 PROCEDURE — 86900 BLOOD TYPING SEROLOGIC ABO: CPT

## 2023-12-27 PROCEDURE — 87340 HEPATITIS B SURFACE AG IA: CPT

## 2023-12-27 PROCEDURE — 84702 CHORIONIC GONADOTROPIN TEST: CPT

## 2023-12-27 NOTE — TELEPHONE ENCOUNTER
15 6/7  eller pregnancy    Pt request for NIPS/carrier lab draw per TK/EP Dr. Morris Brown     Patients name &  verified on lab tubes with patient. NIPS/carrier screen lab drawn, patient tolerated well. Specimen placed at . INVITAE access, call in 2/4 weeks for result, Cautioned patient may receive results via email from [facility] that includes gender results discussed. Patient verbalized understanding.      [place]  utilized [id number]

## 2023-12-27 NOTE — PROGRESS NOTES
JAKE 13w6d    She went to the ED on 12/21 for vaginal bleeding and lower abdominal pain. On ultrasound a single live IUP is present, subchorionic hemorrhage measuring 8.3 x 4.2 x 7.2 cm. She reports today pelvic pain and spotting. Sterile spec done, cervix is closed and no active bleeding or spotting noted. -her prenatal labs were not done, reminded pt. EDC by LMP   -NIPT & Carrier discussed - done today      1. Vaginal bleeding   -Went to [facility] ED 11/18, ultrasound done -subchorionic hematoma, live intrauterine pregnancy, right ovarian cyst with minimal complexity.   -Had 1st trimester US done in clinic 11/29     - single viable iup at 9w6d  fht= 185bpm  ys present  rt ovary corpus luteal cyst measuring 4.8x3.3cm  lt ovary is wnl  there is seen large subchorionic hemorrhage,measuring 4.3x2.3cm near fundus        2. Will need Pap postpartum

## 2023-12-31 LAB
AMB EXT MYRIAD TRISOMY 13: NEGATIVE
AMB EXT MYRIAD TRISOMY 18: NEGATIVE
AMB EXT MYRIAD TRISOMY 21: NEGATIVE

## 2024-01-01 ENCOUNTER — TELEPHONE (OUTPATIENT)
Facility: CLINIC | Age: 24
End: 2024-01-01

## 2024-01-07 ENCOUNTER — TELEPHONE (OUTPATIENT)
Facility: CLINIC | Age: 24
End: 2024-01-07

## 2024-01-07 LAB — AMB EXT MYRIAD FORESIGHT: NEGATIVE

## 2024-01-29 ENCOUNTER — ROUTINE PRENATAL (OUTPATIENT)
Facility: CLINIC | Age: 24
End: 2024-01-29
Payer: MEDICAID

## 2024-01-29 VITALS
HEART RATE: 85 BPM | WEIGHT: 120 LBS | BODY MASS INDEX: 18.19 KG/M2 | DIASTOLIC BLOOD PRESSURE: 62 MMHG | HEIGHT: 68 IN | SYSTOLIC BLOOD PRESSURE: 110 MMHG

## 2024-01-29 DIAGNOSIS — O46.90 VAGINAL BLEEDING DURING PREGNANCY: ICD-10-CM

## 2024-01-29 DIAGNOSIS — O41.8X20 SUBCHORIONIC HEMATOMA IN SECOND TRIMESTER, SINGLE OR UNSPECIFIED FETUS: ICD-10-CM

## 2024-01-29 DIAGNOSIS — L29.2 VULVAR ITCHING: ICD-10-CM

## 2024-01-29 DIAGNOSIS — Z34.90 PRENATAL CARE, ANTEPARTUM: Primary | ICD-10-CM

## 2024-01-29 DIAGNOSIS — O46.8X2 SUBCHORIONIC HEMATOMA IN SECOND TRIMESTER, SINGLE OR UNSPECIFIED FETUS: ICD-10-CM

## 2024-01-29 PROCEDURE — 81514 NFCT DS BV&VAGINITIS DNA ALG: CPT | Performed by: STUDENT IN AN ORGANIZED HEALTH CARE EDUCATION/TRAINING PROGRAM

## 2024-01-29 RX ORDER — TRIAMCINOLONE ACETONIDE 5 MG/G
1 OINTMENT TOPICAL 2 TIMES DAILY
Qty: 15 G | Refills: 0 | Status: SHIPPED | OUTPATIENT
Start: 2024-01-29 | End: 2024-02-12

## 2024-01-29 NOTE — PROGRESS NOTES
Chief Complaint   Patient presents with    Prenatal Care     JAKE 18w 4d  C/o brown discharge along with vaginal itching     Pt having intense itching - vaginosis swab done, vulva appears erythematous, no abnormal discharge (small amt brownish blood). Cervix appears long & closed  SVE - closed  Will rx steroid ointment  Not yet feeling fetal movement     EDC by LMP   -NIPT -  neg  -Carrier - neg     1.Vaginal bleeding   -Went to [facility] ED 11/18, ultrasound done -subchorionic hematoma, live intrauterine pregnancy, right ovarian cyst with minimal complexity.   -Had 1st trimester US done in clinic 11/29 - still large subchorionic hemorrhage,measuring 4.3x2.3cm near fundus, normal FHR  -pt still bleeding on/off as of 18w4d, will refer for L2US instead of EMG anatomy US        2.Will need Pap postpartum

## 2024-01-30 LAB
BV BACTERIA DNA VAG QL NAA+PROBE: NEGATIVE
C GLABRATA DNA VAG QL NAA+PROBE: NEGATIVE
C KRUSEI DNA VAG QL NAA+PROBE: NEGATIVE
CANDIDA DNA VAG QL NAA+PROBE: NEGATIVE
T VAGINALIS DNA VAG QL NAA+PROBE: NEGATIVE

## 2024-02-01 NOTE — PROGRESS NOTES
Outpatient Maternal-Fetal Medicine Consultation    Dear Dr. Sheikh    Thank you for requesting ultrasound evaluation and maternal fetal medicine consultation on your patient Uday Hill.  As you are aware she is a 23 year old female  with a singletonpregnancy and an Estimated Date of Delivery: 24.  A maternal-fetal medicine consultation was requested secondary to  vaginal bleeding with subchorionic hematoma in second trimester .  Her prenatal records and labs were reviewed.    HISTORY  # 1 - Date: 06/10/19, Sex: Male, Weight: 6 lb 9.8 oz (3 kg), GA: 40w0d, Delivery: Normal spontaneous vaginal delivery, Apgar1: None, Apgar5: None, Living: Living, Birth Comments: None    # 2 - Date: , Sex: None, Weight: None, GA: None, Delivery: Spontaneous , Apgar1: None, Apgar5: None, Living: None, Birth Comments: None    # 3 - Date: 21, Sex: Female, Weight: 6 lb 2.1 oz (2.78 kg), GA: 40w0d, Delivery: Normal spontaneous vaginal delivery, Apgar1: None, Apgar5: None, Living: Living, Birth Comments: None    # 4 - Date: 2023, Sex: None, Weight: None, GA: None, Delivery: None, Apgar1: None, Apgar5: None, Living: None, Birth Comments: None    # 5 - Date: None, Sex: None, Weight: None, GA: None, Delivery: None, Apgar1: None, Apgar5: None, Living: None, Birth Comments: None      Past Medical History  The patient  has a past medical history of Language barrier, PONV (postoperative nausea and vomiting), Screening for genetic disease carrier status (2024), and Underweight (2023).    She has no past medical history of Anesthesia complication, Difficult intubation, Family history of malignant hyperthermia, Family history of pseudocholinesterase deficiency, History of adverse reaction to anesthesia, motion sickness, Malignant hyperthermia, or Pseudocholinesterase deficiency.    Past Surgical History  The patient  has no past surgical history on file.    Family History  The patient She  indicated that her mother is alive. She indicated that her father is . She indicated that her maternal grandmother is . She indicated that her maternal grandfather is alive. She indicated that her paternal grandmother is . She indicated that her paternal grandfather is .      Medications:   Current Outpatient Medications:     triamcinolone 0.5 % External Ointment, Apply 1 Application topically 2 (two) times daily for 14 days., Disp: 15 g, Rfl: 0    Prenatal Vit-Fe Fumarate-FA (TRINATE) Oral Tab, Take 1 tablet by mouth daily., Disp: , Rfl:   Allergies: No Known Allergies    PHYSICAL EXAMINATION:  LMP 2023 (Exact Date)   General: alert and oriented,no acute distress  Abdomen: gravid, soft, non-tender  Extremities: non-tender, no edema    OBSTETRIC ULTRASOUND  The patient had a level II ultrasound today which revealed size consistent with dates and a normal detailed anatomic survey.      Ultrasound Findings:  Single IUP in cephalic presentation.    Placenta is posterior.   A 3 vessel cord is noted.  Cardiac activity is present at 138 bpm  EFW 2580 g ( 5 lb 11 oz) 8%  MVP is 4.4 cm .   Umbilical S/D 2.97 - (85%)  BPP 8/8  The fetal measurements are NOT consistent with the established EDC. No ultrasound evidence of structural abnormalities are seen today but survey LIMITED by advanced EGA    Uterus and adnexa appeared WNL today on US    See PACS/Imaging Tab For Complete Ultrasound Report  I interpreted the results and reviewed them with the patient.    DISCUSSION  During her visit we discussed and reviewed the following issues:  SUBCHORIONIC HEMORRHAGE  Patient had vaginal bleeding and subchorionic hematoma was identified .  She has had no further bright red vaginal bleeding for 4 weeks.  She does note persistent brown bleeding.    I discussed the risks of a subchorionic hemorrhage including pregnancy loss, PROM, chronic abruption, IUGR, prematurity, infection and maternal  hemorrhage.  The potential etiologies were reviewed including thrombophilia and genetic abnormalities.     IMPRESSION:  IUP at 20w0d  Subchorionic hematoma:noted    RECOMMENDATIONS:  Continue care with Dr. Sheikh  Monthly growth ultrasounds beginning at 26 weeks  weekly NSTs at 32 weeks    Thank you for allowing me to participate in the care of your patient.  Please do not hesitate to contact me if additional questions or concerns arise.      Nitin Solitario M.D.    40 minutes spent in review of records, patient consultation, documentation and coordination of care.  The relevant clinical matter(s) are summarized above.     Note to patient and family  The 21st Century Cures Act makes medical notes available to patients in the interest of transparency.  However, please be advised that this is a medical document.  It is intended as exzh-es-fzhs communication.  It is written and medical language may contain abbreviations or verbiage that are technical and unfamiliar.  It may appear blunt or direct.  Medical documents are intended to carry relevant information, facts as evident, and the clinical opinion of the practitioner.

## 2024-02-08 ENCOUNTER — ULTRASOUND ENCOUNTER (OUTPATIENT)
Dept: PERINATAL CARE | Facility: HOSPITAL | Age: 24
End: 2024-02-08
Attending: OBSTETRICS & GYNECOLOGY
Payer: MEDICAID

## 2024-02-08 ENCOUNTER — OFFICE VISIT (OUTPATIENT)
Dept: PERINATAL CARE | Facility: HOSPITAL | Age: 24
End: 2024-02-08
Attending: STUDENT IN AN ORGANIZED HEALTH CARE EDUCATION/TRAINING PROGRAM
Payer: MEDICAID

## 2024-02-08 VITALS
BODY MASS INDEX: 18.04 KG/M2 | DIASTOLIC BLOOD PRESSURE: 67 MMHG | WEIGHT: 119 LBS | SYSTOLIC BLOOD PRESSURE: 103 MMHG | HEART RATE: 88 BPM | HEIGHT: 68 IN

## 2024-02-08 DIAGNOSIS — O41.8X20 SUBCHORIONIC HEMATOMA IN SECOND TRIMESTER, SINGLE OR UNSPECIFIED FETUS: ICD-10-CM

## 2024-02-08 DIAGNOSIS — R58 BLEEDING: Primary | ICD-10-CM

## 2024-02-08 DIAGNOSIS — R58 BLEEDING: ICD-10-CM

## 2024-02-08 DIAGNOSIS — O46.8X2 SUBCHORIONIC HEMATOMA IN SECOND TRIMESTER, SINGLE OR UNSPECIFIED FETUS: ICD-10-CM

## 2024-02-08 PROCEDURE — 76811 OB US DETAILED SNGL FETUS: CPT | Performed by: OBSTETRICS & GYNECOLOGY

## 2024-02-19 ENCOUNTER — TELEPHONE (OUTPATIENT)
Facility: CLINIC | Age: 24
End: 2024-02-19

## 2024-02-19 NOTE — TELEPHONE ENCOUNTER
Breast Pump order was received from [facility].  Order form was placed in Dr. Myah Jauregui's bin for signature.

## 2024-02-28 ENCOUNTER — ROUTINE PRENATAL (OUTPATIENT)
Facility: CLINIC | Age: 24
End: 2024-02-28
Payer: MEDICAID

## 2024-02-28 VITALS
HEART RATE: 72 BPM | DIASTOLIC BLOOD PRESSURE: 60 MMHG | BODY MASS INDEX: 18.52 KG/M2 | HEIGHT: 68 IN | WEIGHT: 122.19 LBS | SYSTOLIC BLOOD PRESSURE: 100 MMHG

## 2024-02-28 DIAGNOSIS — O41.8X20 SUBCHORIONIC HEMATOMA IN SECOND TRIMESTER, SINGLE OR UNSPECIFIED FETUS (HCC): ICD-10-CM

## 2024-02-28 DIAGNOSIS — Z34.82 PRENATAL CARE, SUBSEQUENT PREGNANCY IN SECOND TRIMESTER (HCC): Primary | ICD-10-CM

## 2024-02-28 DIAGNOSIS — Z3A.22 22 WEEKS GESTATION OF PREGNANCY (HCC): ICD-10-CM

## 2024-02-28 DIAGNOSIS — O46.8X2 SUBCHORIONIC HEMATOMA IN SECOND TRIMESTER, SINGLE OR UNSPECIFIED FETUS (HCC): ICD-10-CM

## 2024-02-28 PROCEDURE — 99214 OFFICE O/P EST MOD 30 MIN: CPT | Performed by: OBSTETRICS & GYNECOLOGY

## 2024-02-28 NOTE — PROGRESS NOTES
Patient has no more vaginal bleeding, still c/o perineal itching, vag cultures negative last visit - advised change soap, Vaseline      EDC by LMP c/w 9w6d US  -NIPT -  neg  -Carrier - neg     Cynthia  used      Subchorionic hematoma  -Went to [facility] ED 11/18, ultrasound done -subchorionic hematoma, live intrauterine pregnancy, right ovarian cyst with minimal complexity.   -Had 1st trimester US done in clinic 11/29 - still large subchorionic hemorrhage,measuring 4.3x2.3cm near fundus, normal FHR  -L2U normal growth, subchorionic hematoma noted   - growth US monthly starting 26 weeks per MFM  - NST 32 weeks

## 2024-03-27 ENCOUNTER — ULTRASOUND ENCOUNTER (OUTPATIENT)
Facility: CLINIC | Age: 24
End: 2024-03-27
Payer: MEDICAID

## 2024-03-27 ENCOUNTER — ROUTINE PRENATAL (OUTPATIENT)
Facility: CLINIC | Age: 24
End: 2024-03-27
Payer: MEDICAID

## 2024-03-27 VITALS
DIASTOLIC BLOOD PRESSURE: 68 MMHG | HEART RATE: 87 BPM | WEIGHT: 126.63 LBS | SYSTOLIC BLOOD PRESSURE: 108 MMHG | BODY MASS INDEX: 19.19 KG/M2 | HEIGHT: 68 IN

## 2024-03-27 DIAGNOSIS — N89.8 VAGINAL ITCHING: Primary | ICD-10-CM

## 2024-03-27 PROCEDURE — 81514 NFCT DS BV&VAGINITIS DNA ALG: CPT

## 2024-03-27 PROCEDURE — 99213 OFFICE O/P EST LOW 20 MIN: CPT

## 2024-03-27 RX ORDER — AMOXICILLIN 500 MG/1
500 TABLET, FILM COATED ORAL 3 TIMES DAILY
COMMUNITY
Start: 2024-02-21

## 2024-03-27 NOTE — PROGRESS NOTES
JAKE 26w6d    She is moving to [place], will be leaving April 1st. Encouraged her to find an OB then call our office to receive a copy of her medical records  -has vaginal itching - Vaginitis PCR done   -c/o varicosity on her labia majora - reassured patient, advise compression underwear   -offered for her to complete 1 hr GTT, CBC before she moves - will do it in [place] with new provider.     EDC by LMP c/w 9w6d US  -NIPT -  neg  -Carrier - neg     Lao  [id number] utilized for this visit         Subchorionic hematoma  -Went to [facility] 11/18, ultrasound done -subchorionic hematoma, live intrauterine pregnancy, right ovarian cyst with minimal complexity.   -Had 1st trimester US done in clinic 11/29 - still large subchorionic hemorrhage,measuring 4.3x2.3cm near fundus, normal FHR  -L2U normal growth, subchorionic hematoma noted   - growth US monthly starting 26 weeks per MFM      -had US today - results pending OB review   - NST 32 weeks    2. Measuring small for dates  -her fundal height today is 24 cm   - waiting for growth US to be reviewed

## 2024-03-29 LAB
BV BACTERIA DNA VAG QL NAA+PROBE: NEGATIVE
C GLABRATA DNA VAG QL NAA+PROBE: NEGATIVE
C KRUSEI DNA VAG QL NAA+PROBE: NEGATIVE
CANDIDA DNA VAG QL NAA+PROBE: POSITIVE
T VAGINALIS DNA VAG QL NAA+PROBE: NEGATIVE

## 2024-05-15 ENCOUNTER — TELEPHONE (OUTPATIENT)
Facility: CLINIC | Age: 24
End: 2024-05-15

## 2024-05-15 NOTE — TELEPHONE ENCOUNTER
Release of information received from [facility].  Patient will be transferring care.   Pregnancy episode printed and faxed.

## (undated) DEVICE — PREMIUM WET SKIN PREP TRAY: Brand: MEDLINE INDUSTRIES, INC.

## (undated) DEVICE — GYN CDS: Brand: MEDLINE INDUSTRIES, INC.

## (undated) DEVICE — CURRETTE 8MM CVD

## (undated) DEVICE — STERILE POLYISOPRENE POWDER-FREE SURGICAL GLOVES: Brand: PROTEXIS

## (undated) DEVICE — SOL NACL IRRIG 0.9% 1000ML BTL

## (undated) DEVICE — CANISTER SAFETOUCH SYST DISP

## (undated) DEVICE — SLEEVE KENDALL SCD EXPRESS MED

## (undated) NOTE — Clinical Note
I had the pleasure of seeing Ramonia Screws on 11/29/2023. Please see my attached note.   Prince Arteaga MD FACS [facility]

## (undated) NOTE — Clinical Note
I had the pleasure of seeing Camila Gordon on 8/25/2023. Please see my attached note.   Mark Haywood MD FACS [facility]

## (undated) NOTE — LETTER
Date: 6/23/2023    Patient Name: Marina Rodriguez          To Whom it may concern: This letter has been written at the patient's request. The above patient was seen at the [facility] for treatment of a medical condition. This patient should be excused from attending work 6/19/23 through 6/25/23. The patient may return to work with no limitations on 6/26/23.         Sincerely,    Jaime Black